# Patient Record
Sex: MALE | URBAN - METROPOLITAN AREA
[De-identification: names, ages, dates, MRNs, and addresses within clinical notes are randomized per-mention and may not be internally consistent; named-entity substitution may affect disease eponyms.]

---

## 2022-05-11 ENCOUNTER — OFFICE VISIT (OUTPATIENT)
Dept: URBAN - METROPOLITAN AREA CLINIC 54 | Facility: CLINIC | Age: 27
End: 2022-05-11
Payer: COMMERCIAL

## 2022-05-11 DIAGNOSIS — H35.413 LATTICE DEGENERATION OF RETINA, BILATERAL: ICD-10-CM

## 2022-05-11 DIAGNOSIS — H33.302 RETINAL BREAK OF LEFT EYE: Primary | ICD-10-CM

## 2022-05-11 PROCEDURE — 67145 PROPH RTA DTCHMNT PC: CPT | Performed by: OPHTHALMOLOGY

## 2022-05-11 PROCEDURE — 99204 OFFICE O/P NEW MOD 45 MIN: CPT | Performed by: OPHTHALMOLOGY

## 2022-05-11 PROCEDURE — 92134 CPTRZ OPH DX IMG PST SGM RTA: CPT | Performed by: OPHTHALMOLOGY

## 2022-05-11 ASSESSMENT — INTRAOCULAR PRESSURE
OD: 19
OS: 19

## 2022-05-11 NOTE — IMPRESSION/PLAN
Impression: Lattice degeneration of retina, bilateral: H35.413. Plan: At this time, no retinal tear or retinal detachment is identified. No symptoms/SRF/traction. Retinal detachment symptoms were reviewed, as were RBAC's of laser vs obs with patient who elects obs. Patient was encouraged to call our office if there is an increase in floaters, decrease in vision, or a shadow or curtain is noted in their peripheral vision.

## 2022-05-11 NOTE — IMPRESSION/PLAN
Impression: Retinal break of left eye: H33.302. OCT OU = no SRF/IRF OU  / 297 Plan: The patient has a retinal tear which could progress into a retinal detachment causing further visual loss. We discussed the natural history and risk and benefits of the various treatment options including laser and cryo treatment. Risks of treatment include but are not limited to reduced peripheral vision, pain, swelling, recurrent hemorrhage, progressive retinal tear or retinal detachment, new retinal tear and need for additional laser, cryo, or surgery. After discussing the risks, benefits, indications, and alternatives, the patient elects to proceed with laser treatment to the retinal tear. Timeout was performed before procedure. 

1m OCT OU

## 2022-06-15 ENCOUNTER — OFFICE VISIT (OUTPATIENT)
Dept: URBAN - METROPOLITAN AREA CLINIC 54 | Facility: CLINIC | Age: 27
End: 2022-06-15
Payer: COMMERCIAL

## 2022-06-15 DIAGNOSIS — H33.302 RETINAL BREAK OF LEFT EYE: Primary | ICD-10-CM

## 2022-06-15 DIAGNOSIS — H35.413 LATTICE DEGENERATION OF RETINA, BILATERAL: ICD-10-CM

## 2022-06-15 PROCEDURE — 99213 OFFICE O/P EST LOW 20 MIN: CPT | Performed by: OPHTHALMOLOGY

## 2022-06-15 PROCEDURE — 92134 CPTRZ OPH DX IMG PST SGM RTA: CPT | Performed by: OPHTHALMOLOGY

## 2022-06-15 ASSESSMENT — INTRAOCULAR PRESSURE
OD: 13
OS: 12

## 2022-06-15 NOTE — IMPRESSION/PLAN
Impression: Retinal break of left eye: H33.302.
s/p Laser OS - 05/11/2022 OCT OU = no SRF/IRF OU  / 302 Plan: Retinal break well surrounded by laser. No new tears, extension of break, or retinal detachment seen. No ERM/CME on exam.
Retinal detachment warnings given. Call ASAP with changes. Rec yearly dilated exam with us or primary eye care provider 12m OCT OU